# Patient Record
Sex: FEMALE | Race: WHITE | NOT HISPANIC OR LATINO | Employment: OTHER | URBAN - METROPOLITAN AREA
[De-identification: names, ages, dates, MRNs, and addresses within clinical notes are randomized per-mention and may not be internally consistent; named-entity substitution may affect disease eponyms.]

---

## 2023-06-14 ENCOUNTER — TELEPHONE (OUTPATIENT)
Dept: PRIMARY CARE | Facility: CLINIC | Age: 68
End: 2023-06-14
Payer: MEDICARE

## 2023-06-14 NOTE — TELEPHONE ENCOUNTER
Left patient detailed VM of test results.    ----- Message from GUNJAN Bolton sent at 6/12/2023  5:51 PM EDT -----  PNP that her cardiac calcium score is 0 which is the best that it can be

## 2023-06-14 NOTE — TELEPHONE ENCOUNTER
Left patient detailed VM of mammogram results. Kathi Thorpe MA    ----- Message from GUNJAN Bolton sent at 6/12/2023  6:04 PM EDT -----  PNP that mammogram is normal. Repeat in 1 year.

## 2023-08-23 ENCOUNTER — TELEPHONE (OUTPATIENT)
Dept: PRIMARY CARE | Facility: CLINIC | Age: 68
End: 2023-08-23
Payer: MEDICARE

## 2023-08-23 NOTE — TELEPHONE ENCOUNTER
Patient states that she has a UTI, she has a burning sensation and knows its a UTI. Can we send her something to [facility]?

## 2023-09-11 ENCOUNTER — CLINICAL SUPPORT (OUTPATIENT)
Dept: PRIMARY CARE | Facility: CLINIC | Age: 68
End: 2023-09-11
Payer: MEDICARE

## 2023-09-11 ENCOUNTER — TELEPHONE (OUTPATIENT)
Dept: PRIMARY CARE | Facility: CLINIC | Age: 68
End: 2023-09-11

## 2023-09-11 DIAGNOSIS — R30.0 BURNING WITH URINATION: ICD-10-CM

## 2023-09-11 DIAGNOSIS — R35.0 INCREASED URINARY FREQUENCY: ICD-10-CM

## 2023-09-11 DIAGNOSIS — R30.0 DYSURIA: Primary | ICD-10-CM

## 2023-09-11 LAB
POC APPEARANCE, URINE: CLEAR
POC BILIRUBIN, URINE: NEGATIVE
POC BLOOD, URINE: ABNORMAL
POC COLOR, URINE: YELLOW
POC GLUCOSE, URINE: NEGATIVE MG/DL
POC KETONES, URINE: NEGATIVE MG/DL
POC LEUKOCYTES, URINE: ABNORMAL
POC NITRITE,URINE: NEGATIVE
POC PH, URINE: 7 PH
POC PROTEIN, URINE: ABNORMAL MG/DL
POC SPECIFIC GRAVITY, URINE: 1.02
POC UROBILINOGEN, URINE: 1 EU/DL

## 2023-09-11 PROCEDURE — 87086 URINE CULTURE/COLONY COUNT: CPT

## 2023-09-11 PROCEDURE — 87186 SC STD MICRODIL/AGAR DIL: CPT

## 2023-09-11 PROCEDURE — 87077 CULTURE AEROBIC IDENTIFY: CPT

## 2023-09-11 PROCEDURE — 81003 URINALYSIS AUTO W/O SCOPE: CPT | Performed by: FAMILY MEDICINE

## 2023-09-11 RX ORDER — CIPROFLOXACIN 500 MG/1
500 TABLET ORAL 2 TIMES DAILY
Qty: 10 TABLET | Refills: 0 | Status: SHIPPED | OUTPATIENT
Start: 2023-09-11 | End: 2023-09-16

## 2023-09-11 NOTE — PROGRESS NOTES
Patient came in to give a urine sample. She is having some burning with urination and increased frequency and urgency.

## 2023-09-14 LAB — URINE CULTURE: ABNORMAL

## 2023-10-11 ENCOUNTER — TELEPHONE (OUTPATIENT)
Dept: PRIMARY CARE | Facility: CLINIC | Age: 68
End: 2023-10-11
Payer: MEDICARE

## 2023-10-11 DIAGNOSIS — J40 BRONCHITIS: ICD-10-CM

## 2023-10-11 DIAGNOSIS — R30.0 DYSURIA: Primary | ICD-10-CM

## 2023-10-11 RX ORDER — SULFAMETHOXAZOLE AND TRIMETHOPRIM 800; 160 MG/1; MG/1
1 TABLET ORAL 2 TIMES DAILY
Qty: 14 TABLET | Refills: 0 | Status: SHIPPED | OUTPATIENT
Start: 2023-10-11 | End: 2023-10-18

## 2023-10-11 NOTE — TELEPHONE ENCOUNTER
Patient cough, nonproductive, no sinus congestion feels like its  in her chest, afebrile, x 5 days also   Urgency, (again) feeling of fullness, not necessarily pain, off and on x 2 weeks   No open appointments does patient need to drop off a specimen would you be able to send something in for her states feels like bronchitis starting   Please advise, please call [phone]

## 2023-10-20 ENCOUNTER — LAB (OUTPATIENT)
Dept: LAB | Facility: LAB | Age: 68
End: 2023-10-20
Payer: MEDICARE

## 2023-10-20 DIAGNOSIS — E78.2 MIXED HYPERLIPIDEMIA: Primary | ICD-10-CM

## 2023-10-20 LAB
ALBUMIN SERPL BCP-MCNC: 4.2 G/DL (ref 3.4–5)
ALP SERPL-CCNC: 102 U/L (ref 33–136)
ALT SERPL W P-5'-P-CCNC: 18 U/L (ref 7–45)
ANION GAP SERPL CALC-SCNC: 11 MMOL/L (ref 10–20)
AST SERPL W P-5'-P-CCNC: 21 U/L (ref 9–39)
BASOPHILS # BLD AUTO: 0.05 X10*3/UL (ref 0–0.1)
BASOPHILS NFR BLD AUTO: 1 %
BILIRUB SERPL-MCNC: 0.3 MG/DL (ref 0–1.2)
BUN SERPL-MCNC: 25 MG/DL (ref 6–23)
CALCIUM SERPL-MCNC: 9.8 MG/DL (ref 8.6–10.3)
CHLORIDE SERPL-SCNC: 105 MMOL/L (ref 98–107)
CHOLEST SERPL-MCNC: 279 MG/DL (ref 0–199)
CHOLESTEROL/HDL RATIO: 4.2
CO2 SERPL-SCNC: 30 MMOL/L (ref 21–32)
CREAT SERPL-MCNC: 0.63 MG/DL (ref 0.5–1.05)
EOSINOPHIL # BLD AUTO: 0.09 X10*3/UL (ref 0–0.7)
EOSINOPHIL NFR BLD AUTO: 1.8 %
ERYTHROCYTE [DISTWIDTH] IN BLOOD BY AUTOMATED COUNT: 13.2 % (ref 11.5–14.5)
GFR SERPL CREATININE-BSD FRML MDRD: >90 ML/MIN/1.73M*2
GLUCOSE SERPL-MCNC: 93 MG/DL (ref 74–99)
HCT VFR BLD AUTO: 45.6 % (ref 36–46)
HDLC SERPL-MCNC: 65.7 MG/DL
HGB BLD-MCNC: 14.3 G/DL (ref 12–16)
IMM GRANULOCYTES # BLD AUTO: 0.02 X10*3/UL (ref 0–0.7)
IMM GRANULOCYTES NFR BLD AUTO: 0.4 % (ref 0–0.9)
LDLC SERPL CALC-MCNC: 184 MG/DL
LYMPHOCYTES # BLD AUTO: 1.75 X10*3/UL (ref 1.2–4.8)
LYMPHOCYTES NFR BLD AUTO: 34.8 %
MCH RBC QN AUTO: 29.2 PG (ref 26–34)
MCHC RBC AUTO-ENTMCNC: 31.4 G/DL (ref 32–36)
MCV RBC AUTO: 93 FL (ref 80–100)
MONOCYTES # BLD AUTO: 0.54 X10*3/UL (ref 0.1–1)
MONOCYTES NFR BLD AUTO: 10.7 %
NEUTROPHILS # BLD AUTO: 2.58 X10*3/UL (ref 1.2–7.7)
NEUTROPHILS NFR BLD AUTO: 51.3 %
NON HDL CHOLESTEROL: 213 MG/DL (ref 0–149)
NRBC BLD-RTO: 0 /100 WBCS (ref 0–0)
PLATELET # BLD AUTO: 284 X10*3/UL (ref 150–450)
PMV BLD AUTO: 11.1 FL (ref 7.5–11.5)
POTASSIUM SERPL-SCNC: 4.5 MMOL/L (ref 3.5–5.3)
PROT SERPL-MCNC: 7.1 G/DL (ref 6.4–8.2)
RBC # BLD AUTO: 4.9 X10*6/UL (ref 4–5.2)
SODIUM SERPL-SCNC: 141 MMOL/L (ref 136–145)
TRIGL SERPL-MCNC: 149 MG/DL (ref 0–149)
TSH SERPL-ACNC: 2.79 MIU/L (ref 0.44–3.98)
VLDL: 30 MG/DL (ref 0–40)
WBC # BLD AUTO: 5 X10*3/UL (ref 4.4–11.3)

## 2023-10-20 PROCEDURE — 36415 COLL VENOUS BLD VENIPUNCTURE: CPT

## 2023-10-20 PROCEDURE — 84443 ASSAY THYROID STIM HORMONE: CPT

## 2023-10-20 PROCEDURE — 85025 COMPLETE CBC W/AUTO DIFF WBC: CPT

## 2023-10-20 PROCEDURE — 80053 COMPREHEN METABOLIC PANEL: CPT

## 2023-10-20 PROCEDURE — 80061 LIPID PANEL: CPT

## 2023-12-26 NOTE — TELEPHONE ENCOUNTER
Dropped off a urine sample. It came back with BLO-Moderate, PRO- 100 mg/dL, and MAEGAN- Small.     [facility]   Vitamin-D deficiency currently taking 39036 units weekly.  Continue current dose.  Recheck a level in 6 months.

## 2024-01-15 ENCOUNTER — TELEPHONE (OUTPATIENT)
Dept: DERMATOLOGY | Facility: CLINIC | Age: 69
End: 2024-01-15
Payer: MEDICARE

## 2024-01-15 DIAGNOSIS — L71.9 ROSACEA: Primary | ICD-10-CM

## 2024-01-15 RX ORDER — CEFUROXIME AXETIL 250 MG/1
250 TABLET ORAL DAILY
Qty: 30 TABLET | Refills: 0 | OUTPATIENT
Start: 2024-01-15 | End: 2024-05-09

## 2024-01-15 NOTE — TELEPHONE ENCOUNTER
PT needs refills on her ceftin 250 for acne/rosacea please - pharmacy updated and she is scheduled for 1/29/2024 - thanks

## 2024-01-29 ENCOUNTER — OFFICE VISIT (OUTPATIENT)
Dept: DERMATOLOGY | Facility: CLINIC | Age: 69
End: 2024-01-29
Payer: MEDICARE

## 2024-01-29 DIAGNOSIS — L71.9 ROSACEA: ICD-10-CM

## 2024-01-29 PROCEDURE — 99213 OFFICE O/P EST LOW 20 MIN: CPT | Performed by: SPECIALIST

## 2024-01-29 PROCEDURE — 1159F MED LIST DOCD IN RCRD: CPT | Performed by: SPECIALIST

## 2024-01-29 RX ORDER — CEFUROXIME AXETIL 250 MG/1
250 TABLET ORAL 2 TIMES DAILY
Qty: 60 TABLET | Refills: 2 | OUTPATIENT
Start: 2024-01-29 | End: 2024-05-09

## 2024-01-29 NOTE — PROGRESS NOTES
Subjective   HPI: Jessie Shea is a 68 y.o. female is here for evaluation and treatment of my rosacea.  I have been breaking out and bumps..     ROS: No other skin or systemic complaints other than what is documented elsewhere in the note.    ALLERGIES: Patient has no known allergies.    SOCIAL:  has no history on file for tobacco use, alcohol use, and drug use.    Objective     Description: Patient has fairly significant background erythema with an oily flaky dermatitis along with inflammatory papules involving bilateral Maller cheeks and her nose.  Clinically is consistent with inflammatory acne rosacea and seborrheic dermatitis.    Assessment/Plan   1. Rosacea  Head - Anterior (Face)    Related Medications  cefuroxime (Ceftin) 250 mg tablet  Take 1 tablet (250 mg) by mouth once daily. Take 1 tablet po every day       Plan: Ceftin 250 mg p.o. twice daily.  Ketoconazole, iodoquinol, hydrocortisone mix twice daily to face.    FOLLOW UP:4 weeks.    The patient was encouraged to contact me with any further questions or concerns.  Andreas Macedo MD  1/29/2024

## 2024-01-29 NOTE — PROGRESS NOTES
Subjective     Jessie Shea is a 68 y.o. female who presents for the following: Follow-up (Rosacea - face ).     Review of Systems:  No other skin or systemic complaints other than what is documented elsewhere in the note.    The following portions of the chart were reviewed this encounter and updated as appropriate:          Skin Cancer History  No skin cancer on file.      Specialty Problems    None       Objective   Well appearing patient in no apparent distress; mood and affect are within normal limits.    A focused skin examination was performed. All findings within normal limits unless otherwise noted below.    Assessment/Plan   1. Rosacea  Head - Anterior (Face)    Related Medications  cefuroxime (Ceftin) 250 mg tablet  Take 1 tablet (250 mg) by mouth once daily. Take 1 tablet po every day

## 2024-02-08 ENCOUNTER — TELEPHONE (OUTPATIENT)
Dept: PRIMARY CARE | Facility: CLINIC | Age: 69
End: 2024-02-08
Payer: MEDICARE

## 2024-02-08 NOTE — TELEPHONE ENCOUNTER
Med Refill  cefuroxime (Ceftin) 250 mg tablet [[id number]]     [facility] - [address] [address]  Phone: [phone]  Fax: [phone]

## 2024-02-15 DIAGNOSIS — Z12.11 SCREENING FOR COLON CANCER: ICD-10-CM

## 2024-02-15 RX ORDER — POLYETHYLENE GLYCOL-3350 AND ELECTROLYTES 236; 6.74; 5.86; 2.97; 22.74 G/274.31G; G/274.31G; G/274.31G; G/274.31G; G/274.31G
POWDER, FOR SOLUTION ORAL
Qty: 4000 ML | Refills: 0 | OUTPATIENT
Start: 2024-02-15 | End: 2024-05-09

## 2024-03-15 ENCOUNTER — APPOINTMENT (OUTPATIENT)
Dept: PRIMARY CARE | Facility: CLINIC | Age: 69
End: 2024-03-15
Payer: MEDICARE

## 2024-05-07 RX ORDER — METRONIDAZOLE 7.5 MG/G
1 LOTION TOPICAL
COMMUNITY
Start: 2020-03-30

## 2024-05-07 RX ORDER — VENLAFAXINE HYDROCHLORIDE 75 MG/1
75 CAPSULE, EXTENDED RELEASE ORAL DAILY
COMMUNITY
Start: 2015-09-17

## 2024-05-09 ENCOUNTER — ANESTHESIA EVENT (OUTPATIENT)
Dept: GASTROENTEROLOGY | Facility: HOSPITAL | Age: 69
End: 2024-05-09
Payer: MEDICARE

## 2024-05-09 ENCOUNTER — HOSPITAL ENCOUNTER (OUTPATIENT)
Dept: GASTROENTEROLOGY | Facility: HOSPITAL | Age: 69
Setting detail: OUTPATIENT SURGERY
Discharge: HOME | End: 2024-05-09
Payer: MEDICARE

## 2024-05-09 ENCOUNTER — ANESTHESIA (OUTPATIENT)
Dept: GASTROENTEROLOGY | Facility: HOSPITAL | Age: 69
End: 2024-05-09
Payer: MEDICARE

## 2024-05-09 VITALS
BODY MASS INDEX: 25.88 KG/M2 | WEIGHT: 140.65 LBS | SYSTOLIC BLOOD PRESSURE: 147 MMHG | HEART RATE: 77 BPM | TEMPERATURE: 97.2 F | OXYGEN SATURATION: 98 % | RESPIRATION RATE: 16 BRPM | HEIGHT: 62 IN | DIASTOLIC BLOOD PRESSURE: 68 MMHG

## 2024-05-09 DIAGNOSIS — Z12.11 SCREENING FOR COLON CANCER: Primary | ICD-10-CM

## 2024-05-09 PROBLEM — F41.9 ANXIETY: Status: ACTIVE | Noted: 2024-05-09

## 2024-05-09 PROBLEM — E78.5 HYPERLIPIDEMIA: Status: ACTIVE | Noted: 2024-05-09

## 2024-05-09 PROCEDURE — G0105 COLORECTAL SCRN; HI RISK IND: HCPCS | Performed by: COLON & RECTAL SURGERY

## 2024-05-09 PROCEDURE — 7100000010 HC PHASE TWO TIME - EACH INCREMENTAL 1 MINUTE

## 2024-05-09 PROCEDURE — 3700000001 HC GENERAL ANESTHESIA TIME - INITIAL BASE CHARGE

## 2024-05-09 PROCEDURE — A45378 PR COLONOSCOPY,DIAGNOSTIC: Performed by: NURSE ANESTHETIST, CERTIFIED REGISTERED

## 2024-05-09 PROCEDURE — 45378 DIAGNOSTIC COLONOSCOPY: CPT | Performed by: COLON & RECTAL SURGERY

## 2024-05-09 PROCEDURE — 3700000002 HC GENERAL ANESTHESIA TIME - EACH INCREMENTAL 1 MINUTE

## 2024-05-09 PROCEDURE — 2500000005 HC RX 250 GENERAL PHARMACY W/O HCPCS: Performed by: NURSE ANESTHETIST, CERTIFIED REGISTERED

## 2024-05-09 PROCEDURE — A45378 PR COLONOSCOPY,DIAGNOSTIC: Performed by: ANESTHESIOLOGY

## 2024-05-09 PROCEDURE — 7100000009 HC PHASE TWO TIME - INITIAL BASE CHARGE

## 2024-05-09 PROCEDURE — 2500000004 HC RX 250 GENERAL PHARMACY W/ HCPCS (ALT 636 FOR OP/ED): Performed by: NURSE ANESTHETIST, CERTIFIED REGISTERED

## 2024-05-09 RX ORDER — LIDOCAINE HYDROCHLORIDE 20 MG/ML
INJECTION, SOLUTION EPIDURAL; INFILTRATION; INTRACAUDAL; PERINEURAL AS NEEDED
Status: DISCONTINUED | OUTPATIENT
Start: 2024-05-09 | End: 2024-05-09

## 2024-05-09 RX ORDER — SODIUM CHLORIDE, SODIUM LACTATE, POTASSIUM CHLORIDE, CALCIUM CHLORIDE 600; 310; 30; 20 MG/100ML; MG/100ML; MG/100ML; MG/100ML
20 INJECTION, SOLUTION INTRAVENOUS CONTINUOUS
Status: DISCONTINUED | OUTPATIENT
Start: 2024-05-09 | End: 2024-05-10 | Stop reason: HOSPADM

## 2024-05-09 RX ORDER — PROPOFOL 10 MG/ML
INJECTION, EMULSION INTRAVENOUS AS NEEDED
Status: DISCONTINUED | OUTPATIENT
Start: 2024-05-09 | End: 2024-05-09

## 2024-05-09 RX ORDER — SODIUM CHLORIDE, SODIUM LACTATE, POTASSIUM CHLORIDE, CALCIUM CHLORIDE 600; 310; 30; 20 MG/100ML; MG/100ML; MG/100ML; MG/100ML
INJECTION, SOLUTION INTRAVENOUS CONTINUOUS PRN
Status: DISCONTINUED | OUTPATIENT
Start: 2024-05-09 | End: 2024-05-09

## 2024-05-09 RX ORDER — ESMOLOL HYDROCHLORIDE 10 MG/ML
INJECTION INTRAVENOUS AS NEEDED
Status: DISCONTINUED | OUTPATIENT
Start: 2024-05-09 | End: 2024-05-09

## 2024-05-09 RX ADMIN — ESMOLOL HYDROCHLORIDE 20 MG: 100 INJECTION, SOLUTION INTRAVENOUS at 16:00

## 2024-05-09 RX ADMIN — PROPOFOL 50 MG: 10 INJECTION, EMULSION INTRAVENOUS at 15:50

## 2024-05-09 RX ADMIN — PROPOFOL 60 MG: 10 INJECTION, EMULSION INTRAVENOUS at 15:59

## 2024-05-09 RX ADMIN — PROPOFOL 50 MG: 10 INJECTION, EMULSION INTRAVENOUS at 15:54

## 2024-05-09 RX ADMIN — LIDOCAINE HYDROCHLORIDE 80 MG: 20 INJECTION, SOLUTION EPIDURAL; INFILTRATION; INTRACAUDAL; PERINEURAL at 15:44

## 2024-05-09 RX ADMIN — SODIUM CHLORIDE, POTASSIUM CHLORIDE, SODIUM LACTATE AND CALCIUM CHLORIDE: 600; 310; 30; 20 INJECTION, SOLUTION INTRAVENOUS at 15:41

## 2024-05-09 RX ADMIN — PROPOFOL 50 MG: 10 INJECTION, EMULSION INTRAVENOUS at 15:47

## 2024-05-09 RX ADMIN — PROPOFOL 50 MG: 10 INJECTION, EMULSION INTRAVENOUS at 15:45

## 2024-05-09 RX ADMIN — PROPOFOL 40 MG: 10 INJECTION, EMULSION INTRAVENOUS at 16:04

## 2024-05-09 RX ADMIN — PROPOFOL 30 MG: 10 INJECTION, EMULSION INTRAVENOUS at 16:02

## 2024-05-09 RX ADMIN — PROPOFOL 70 MG: 10 INJECTION, EMULSION INTRAVENOUS at 15:44

## 2024-05-09 RX ADMIN — PROPOFOL 50 MG: 10 INJECTION, EMULSION INTRAVENOUS at 15:56

## 2024-05-09 RX ADMIN — PROPOFOL 50 MG: 10 INJECTION, EMULSION INTRAVENOUS at 15:52

## 2024-05-09 RX ADMIN — PROPOFOL 40 MG: 10 INJECTION, EMULSION INTRAVENOUS at 16:00

## 2024-05-09 ASSESSMENT — PAIN SCALES - GENERAL
PAINLEVEL_OUTOF10: 0 - NO PAIN

## 2024-05-09 ASSESSMENT — ENCOUNTER SYMPTOMS
GASTROINTESTINAL NEGATIVE: 1
RESPIRATORY NEGATIVE: 1
HEMATOLOGIC/LYMPHATIC NEGATIVE: 1
EYES NEGATIVE: 1
CARDIOVASCULAR NEGATIVE: 1
NEUROLOGICAL NEGATIVE: 1
CONSTITUTIONAL NEGATIVE: 1
ENDOCRINE NEGATIVE: 1

## 2024-05-09 ASSESSMENT — PAIN - FUNCTIONAL ASSESSMENT
PAIN_FUNCTIONAL_ASSESSMENT: 0-10

## 2024-05-09 ASSESSMENT — COLUMBIA-SUICIDE SEVERITY RATING SCALE - C-SSRS
6. HAVE YOU EVER DONE ANYTHING, STARTED TO DO ANYTHING, OR PREPARED TO DO ANYTHING TO END YOUR LIFE?: NO
1. IN THE PAST MONTH, HAVE YOU WISHED YOU WERE DEAD OR WISHED YOU COULD GO TO SLEEP AND NOT WAKE UP?: NO
2. HAVE YOU ACTUALLY HAD ANY THOUGHTS OF KILLING YOURSELF?: NO

## 2024-05-09 NOTE — DISCHARGE INSTRUCTIONS
Patient Instructions after a Colonoscopy      The anesthetics, sedatives or narcotics which were given to you today will be acting in your body for the next 24 hours, so you might feel a little sleepy or groggy.  This feeling should slowly wear off. Carefully read and follow the instructions.     You received sedation today:  - Do not drive or operate any machinery or power tools of any kind.   - No alcoholic beverages today, not even beer or wine.  - Do not make any important decisions or sign any legal documents.  - No over the counter medications that contain alcohol or that may cause drowsiness.  - Do not make any important decisions or sign any legal documents.    While it is common to experience mild to moderate abdominal distention, gas, or belching after your procedure, if any of these symptoms occur following discharge from the GI Lab or within one week of having your procedure, call the [facility] to be advised whether a visit to your nearest Urgent Care or Emergency Department is indicated.  Take this paper with you if you go.     - If you develop an allergic reaction to the medications that were given during your procedure such as difficulty breathing, rash, hives, severe nausea, vomiting or lightheadedness.  - If you experience chest pain, shortness of breath, severe abdominal pain, fevers and chills.  -If you develop signs and symptoms of bleeding such as blood in your spit, if your stools turn black, tarry, or bloody  - If you have not urinated within 8 hours following your procedure.  - If your IV site becomes painful, red, inflamed, or looks infected.    If you received a biopsy/polypectomy/sphincterotomy the following instructions apply below:    __ Do not use Aspirin containing products, non-steroidal medications or anti-coagulants for one week following your procedure. (Examples of these types of medications are: Advil, Arthrotec, Aleve, Coumadin, Ecotrin, Heparin, Ibuprofen,  Indocin, Motrin, Naprosyn, Nuprin, Plavix, Vioxx, and Voltarin, or their generic forms.  This list is not all-inclusive.  Check with your physician or pharmacist before resuming medications.)   __ Eat a soft diet today.  Avoid foods that are poorly digested for the next 24 hours.  These foods would include: nuts, beans, lettuce, red meats, and fried foods. Start with liquids and advance your diet as tolerated, gradually work up to eating solids.   __ Do not have a Barium Study or Enema for one week.    Your physician recommends the additional following instructions:    -You have a contact number available for emergencies. The signs and symptoms of potential delayed complications were discussed with you. You may return to normal activities tomorrow.  -Resume your previous diet.  -Continue your present medications.   -We are waiting for your pathology results.  -Your physician has recommended a repeat colonoscopy (date to be determined after pending pathology results are reviewed) for surveillance based on pathology results.  -The findings and recommendations have been discussed with you.  -The findings and recommendations were discussed with your family.  - Please see Medication Reconciliation Form for new medication/medications prescribed.       If you experience any problems or have any questions following discharge from the GI Lab, please call:        Nurse Signature                                                                        Date___________________                                                                            Patient/Responsible Party Signature                                        Date___________________

## 2024-05-09 NOTE — H&P
History Of Present Illness  Jessie Shea is a 69 y.o. female presenting with history of tubular adenoma of the IC valve.     Past Medical History  Past Medical History:   Diagnosis Date    Personal history of other diseases of the musculoskeletal system and connective tissue     History of arthritis    Personal history of other mental and behavioral disorders     History of depression       Surgical History  Past Surgical History:   Procedure Laterality Date    OTHER SURGICAL HISTORY  02/12/2018    Vaginal Surgery        Social History  She reports that she has never smoked. She has never used smokeless tobacco. She reports current alcohol use. Drug use questions deferred to the physician.    Family History  No family history on file.     Allergies  Patient has no known allergies.    Review of Systems   Constitutional: Negative.    HENT: Negative.     Eyes: Negative.    Respiratory: Negative.     Cardiovascular: Negative.    Gastrointestinal: Negative.    Endocrine: Negative.    Genitourinary: Negative.    Skin: Negative.    Neurological: Negative.    Hematological: Negative.         Physical Exam  Constitutional:       General: She is not in acute distress.     Appearance: Normal appearance.   HENT:      Head: Normocephalic and atraumatic.   Eyes:      Extraocular Movements: Extraocular movements intact.   Cardiovascular:      Rate and Rhythm: Normal rate and regular rhythm.      Heart sounds: No murmur heard.  Pulmonary:      Effort: Pulmonary effort is normal.      Breath sounds: Normal breath sounds.   Abdominal:      General: There is no distension.      Palpations: Abdomen is soft.      Tenderness: There is no abdominal tenderness.   Musculoskeletal:      Cervical back: Neck supple.   Skin:     General: Skin is warm and dry.   Neurological:      Mental Status: She is alert and oriented to person, place, and time.   Psychiatric:         Mood and Affect: Mood normal.         Behavior: Behavior normal.         "  Last Recorded Vitals  Blood pressure 147/83, pulse 71, temperature 36.2 °C (97.2 °F), temperature source Temporal, resp. rate 16, height 1.575 m (5' 2\"), weight 63.8 kg (140 lb 10.5 oz), SpO2 99%.    Relevant Results         Assessment/Plan   Active Problems:  There are no active Hospital Problems.  69F with history of recurrent ileocecal valve tubular adenoma in 2019, here for screening colonoscopy.    - Consent signed       I spent 5 minutes in the professional and overall care of this patient.      Bre Quesada MD    "

## 2024-05-09 NOTE — LETTER
Varsha Castañeda MD   [facility]   [address] [address]   [phone]      Dear Ms. Shea,        It was my pleasure to see you at your recent colonoscopy.  At that time,  your examination was completely normal.  The current recommendation is to repeat the colonoscopy in 5 years due to your history of polyps.     Thank you very much for allowing me to take part in your care, please feel free to contact me with any questions or concerns at [phone].          Sincerely,       Varsha Castañeda M.D. FACS, FASCRS    CC:  Primary Care:

## 2024-05-09 NOTE — ANESTHESIA POSTPROCEDURE EVALUATION
Patient: Jessie Shea    Procedure Summary       Date: 05/09/24 Room / Location: [facility]    Anesthesia Start: 1541 Anesthesia Stop: 1617    Procedure: COLONOSCOPY Diagnosis: Screening for colon cancer    Scheduled Providers: Varsha Castañeda MD; Thierry Antony MD; RAFAEL Stanford; Leslie Hart RN Responsible Provider: SASKIA Burton    Anesthesia Type: MAC ASA Status: 2            Anesthesia Type: MAC    Vitals Value Taken Time   /72 05/09/24 1617   Temp 36 05/09/24 1617   Pulse 104 05/09/24 1617   Resp 15 05/09/24 1617   SpO2 97 05/09/24 1617       Anesthesia Post Evaluation    Patient location during evaluation: PACU  Patient participation: complete - patient participated  Level of consciousness: awake and alert  Pain management: adequate  Airway patency: patent  Cardiovascular status: acceptable  Respiratory status: acceptable  Hydration status: acceptable  Postoperative Nausea and Vomiting: none      No notable events documented.

## 2024-05-09 NOTE — ANESTHESIA PREPROCEDURE EVALUATION
Patient: Jessie Shea    Procedure Information       Date/Time: 05/09/24 1420    Scheduled providers: Varsha Castañeda MD; Thierry Antony MD; RAFAEL Stanford; Leslie Hart RN    Procedure: COLONOSCOPY    Location: [facility] [place]            Relevant Problems   Cardiac   (+) Hyperlipidemia      Neuro   (+) Anxiety       Clinical information reviewed:   Tobacco  Allergies  Meds   Med Hx  Surg Hx  OB Status  Fam Hx  Soc   Hx        NPO Detail:  NPO/Void Status  Date of Last Liquid: 05/08/24  Time of Last Liquid: 2200  Date of Last Solid: 05/08/24  Time of Last Solid: 1300         Physical Exam    Airway  Mallampati: II     Cardiovascular   Rhythm: regular  Rate: normal     Dental - normal exam     Pulmonary    Abdominal            Anesthesia Plan    History of general anesthesia?: yes  History of complications of general anesthesia?: no    ASA 2     MAC     intravenous induction   Anesthetic plan and risks discussed with patient.    Plan discussed with CRNA and RAFAEL.

## 2024-09-23 ENCOUNTER — APPOINTMENT (OUTPATIENT)
Dept: AUDIOLOGY | Facility: HOSPITAL | Age: 69
End: 2024-09-23
Payer: MEDICARE

## 2024-10-21 ENCOUNTER — CLINICAL SUPPORT (OUTPATIENT)
Dept: AUDIOLOGY | Facility: HOSPITAL | Age: 69
End: 2024-10-21
Payer: MEDICARE

## 2024-10-21 DIAGNOSIS — H90.3 SENSORINEURAL HEARING LOSS, ASYMMETRICAL: Primary | ICD-10-CM

## 2024-10-21 PROCEDURE — 92550 TYMPANOMETRY & REFLEX THRESH: CPT | Performed by: AUDIOLOGIST

## 2024-10-21 PROCEDURE — 92557 COMPREHENSIVE HEARING TEST: CPT | Performed by: AUDIOLOGIST

## 2024-10-21 ASSESSMENT — PAIN - FUNCTIONAL ASSESSMENT: PAIN_FUNCTIONAL_ASSESSMENT: 0-10

## 2024-10-21 ASSESSMENT — PAIN SCALES - GENERAL: PAINLEVEL_OUTOF10: 0 - NO PAIN

## 2024-10-21 NOTE — PROGRESS NOTES
"AUDIOLOGY ADULT AUDIOMETRIC EVALUATION      Name:  Jessie Shea  :  1955  Age:  69 y.o.  Date of Evaluation:  10/21/2024     IMPRESSIONS:  Today's test results are consistent with asymmetric hearing loss with overall mild sensorineural hearing loss in the right ear and mild high frequency sensorineural hearing loss in the left ear.  Her word discrimination scores are excellent in the right ear and good in the left ear.  Her tympanograms show normal tympanic membrane mobility bilaterally.      Communication strategies to ease listening difficulties were discussed.  For example,   1. Speakers should be in the same room and facing the listener.  2. Speakers should obtain the listener's attention before speaking.    3. Speakers should speak slowly and clearly.  4. Reduce background noises during conversations when possible.     RECOMMENDATIONS:  -Medical / Otolaryngology consultation  -Annual audiologic evaluation, as otherwise recommended by otolaryngologist, or as changes are noted.    ------------------------------------------------    HISTORY:  Reason for visit:  Ms. Shea is seen today at the request of Cristiano Kerr MD for an evaluation of hearing.  Patient complains of Hearing Loss (Gradually progressive hearing loss bilaterally, worse in the right ear.)  Right ear feels \"stuffy\" but not a sensation of pressure or fullness.  She sometimes struggles to follow conversation with her , particularly following his recent surgery.      Family history of hearing loss:  yes, parents with hearing loss in older age    Previous hearing test:  no  Otalgia:  no  Aural Fullness:  no  Otitis Media: no  PE Tubes:  no  Other otologic surgical history:  no  Tinnitus:   no  Dizziness:  no  Noise Exposure: no  Hearing aid history: none  Other significant history:  none    EVALUATION    Otoscopic Evaluation:        Clear ear canal, tympanic membrane visualized bilaterally                Pure Tone Audiometry:  " Conventional audiometry (125-8000Hz) via insert earphones with good response reliability      Right ear: overall mild sensorineural hearing loss with region of normal hearing sensitivity at 1000-2000Hz       Left ear:  normal hearing sensitivity from 125-3000Hz sloping to mild high frequency sensorineural hearing loss     Speech Audiometry:        Right ear:  Speech Reception Threshold (SRT) was obtained at 25 dBHL                 Word Recognition scores were excellent at 40dBSL re: SRT       Left ear:  Speech Reception Threshold (SRT) was obtained at 20 dBHL                 Word Recognition scores were good at 40dBSL re: SRT      Immittance Measures: 226Hz       Both ears:  Tympanogram shows normal middle ear pressure, static compliance, and ear canal volume.  Acoustic reflexes were consistent with pure tone results ipsilaterally and contralaterally.            Distortion Product Otoacoustic Emissions: Assesses the cochlear outer hair cell function (2107-4158 Hz frequency range)        Right ear:  present 2000-4000Hz    absent 1500 and 6000-8000Hz       Left ear:  present 3000Hz    absent 6245-0747 and 4000-8000Hz        PATIENT EDUCATION:   Discussed results and recommendations with MsKerry Shabbir.  Questions were addressed and the patient was encouraged to contact our department should concerns arise.    Time:  08:56 to 09:42    CHAPO Edwards, CCC-A  Senior Audiologist

## 2024-10-21 NOTE — LETTER
"  2024         Cristiano Kerr MD  [address] [facility] [address]      Patient: Jessie Shea   YOB: 1955   Date of Visit: 10/21/2024       Dear Cristiano Kerr MD:    Thank you for referring Jessie Shea to me for evaluation. Below are my notes for this consultation.  If you have questions, please do not hesitate to call me. I look forward to following your patient along with you.       Sincerely,     CHAPO Edwards, CCC-A  Senior Audiologist      CC:   No Recipients  ______________________________________________________________________________________    AUDIOLOGY ADULT AUDIOMETRIC EVALUATION      Name:  Jessie Shea  :  1955  Age:  69 y.o.  Date of Evaluation:  10/21/2024     IMPRESSIONS:  Today's test results are consistent with asymmetric hearing loss with overall mild sensorineural hearing loss in the right ear and mild high frequency sensorineural hearing loss in the left ear.  Her word discrimination scores are excellent in the right ear and good in the left ear.  Her tympanograms show normal tympanic membrane mobility bilaterally.      Communication strategies to ease listening difficulties were discussed.  For example,   1. Speakers should be in the same room and facing the listener.  2. Speakers should obtain the listener's attention before speaking.    3. Speakers should speak slowly and clearly.  4. Reduce background noises during conversations when possible.     RECOMMENDATIONS:  -Medical / Otolaryngology consultation  -Annual audiologic evaluation, as otherwise recommended by otolaryngologist, or as changes are noted.    ------------------------------------------------    HISTORY:  Reason for visit:  Ms. Shea is seen today at the request of Cristiano Kerr MD for an evaluation of hearing.  Patient complains of Hearing Loss (Gradually progressive hearing loss bilaterally, worse in the right ear.)  Right ear feels \"stuffy\" but not a sensation of " pressure or fullness.  She sometimes struggles to follow conversation with her , particularly following his recent surgery.      Family history of hearing loss:  yes, parents with hearing loss in older age    Previous hearing test:  no  Otalgia:  no  Aural Fullness:  no  Otitis Media: no  PE Tubes:  no  Other otologic surgical history:  no  Tinnitus:   no  Dizziness:  no  Noise Exposure: no  Hearing aid history: none  Other significant history:  none    EVALUATION    Otoscopic Evaluation:        Clear ear canal, tympanic membrane visualized bilaterally                Pure Tone Audiometry:  Conventional audiometry (125-8000Hz) via insert earphones with good response reliability      Right ear: overall mild sensorineural hearing loss with region of normal hearing sensitivity at 1000-2000Hz       Left ear:  normal hearing sensitivity from 125-3000Hz sloping to mild high frequency sensorineural hearing loss     Speech Audiometry:        Right ear:  Speech Reception Threshold (SRT) was obtained at 25 dBHL                 Word Recognition scores were excellent at 40dBSL re: SRT       Left ear:  Speech Reception Threshold (SRT) was obtained at 20 dBHL                 Word Recognition scores were good at 40dBSL re: SRT      Immittance Measures: 226Hz       Both ears:  Tympanogram shows normal middle ear pressure, static compliance, and ear canal volume.  Acoustic reflexes were consistent with pure tone results ipsilaterally and contralaterally.            Distortion Product Otoacoustic Emissions: Assesses the cochlear outer hair cell function (2704-1215 Hz frequency range)        Right ear:  present 2000-4000Hz    absent 1500 and 6000-8000Hz       Left ear:  present 3000Hz    absent 9832-3059 and 4000-8000Hz        PATIENT EDUCATION:   Discussed results and recommendations with Ms. Shabbir.  Questions were addressed and the patient was encouraged to contact our department should concerns arise.    Time:  08:56 to  09:42    CHAPO Edwards, CCC-A  Senior Audiologist